# Patient Record
Sex: FEMALE | Race: BLACK OR AFRICAN AMERICAN | NOT HISPANIC OR LATINO | Employment: UNEMPLOYED | URBAN - METROPOLITAN AREA
[De-identification: names, ages, dates, MRNs, and addresses within clinical notes are randomized per-mention and may not be internally consistent; named-entity substitution may affect disease eponyms.]

---

## 2024-01-18 ENCOUNTER — OFFICE VISIT (OUTPATIENT)
Dept: FAMILY MEDICINE | Facility: CLINIC | Age: 64
End: 2024-01-18
Payer: COMMERCIAL

## 2024-01-18 VITALS
SYSTOLIC BLOOD PRESSURE: 128 MMHG | DIASTOLIC BLOOD PRESSURE: 68 MMHG | HEART RATE: 72 BPM | TEMPERATURE: 98.5 F | WEIGHT: 202 LBS | OXYGEN SATURATION: 100 % | RESPIRATION RATE: 16 BRPM | BODY MASS INDEX: 31.71 KG/M2 | HEIGHT: 67 IN

## 2024-01-18 DIAGNOSIS — Z00.00 ROUTINE GENERAL MEDICAL EXAMINATION AT A HEALTH CARE FACILITY: Primary | ICD-10-CM

## 2024-01-18 DIAGNOSIS — I10 BENIGN ESSENTIAL HYPERTENSION: ICD-10-CM

## 2024-01-18 DIAGNOSIS — E78.5 HYPERLIPIDEMIA LDL GOAL <130: ICD-10-CM

## 2024-01-18 LAB
ERYTHROCYTE [DISTWIDTH] IN BLOOD BY AUTOMATED COUNT: 12.1 % (ref 10–15)
HCT VFR BLD AUTO: 42.5 % (ref 35–47)
HGB BLD-MCNC: 14 G/DL (ref 11.7–15.7)
MCH RBC QN AUTO: 30.8 PG (ref 26.5–33)
MCHC RBC AUTO-ENTMCNC: 32.9 G/DL (ref 31.5–36.5)
MCV RBC AUTO: 94 FL (ref 78–100)
PLATELET # BLD AUTO: 188 10E3/UL (ref 150–450)
RBC # BLD AUTO: 4.54 10E6/UL (ref 3.8–5.2)
WBC # BLD AUTO: 5.4 10E3/UL (ref 4–11)

## 2024-01-18 PROCEDURE — 85027 COMPLETE CBC AUTOMATED: CPT | Performed by: FAMILY MEDICINE

## 2024-01-18 PROCEDURE — 82306 VITAMIN D 25 HYDROXY: CPT | Performed by: FAMILY MEDICINE

## 2024-01-18 PROCEDURE — 80053 COMPREHEN METABOLIC PANEL: CPT | Performed by: FAMILY MEDICINE

## 2024-01-18 PROCEDURE — 99386 PREV VISIT NEW AGE 40-64: CPT | Performed by: FAMILY MEDICINE

## 2024-01-18 PROCEDURE — 36415 COLL VENOUS BLD VENIPUNCTURE: CPT | Performed by: FAMILY MEDICINE

## 2024-01-18 PROCEDURE — 99213 OFFICE O/P EST LOW 20 MIN: CPT | Mod: 25 | Performed by: FAMILY MEDICINE

## 2024-01-18 PROCEDURE — 80061 LIPID PANEL: CPT | Performed by: FAMILY MEDICINE

## 2024-01-18 RX ORDER — ATORVASTATIN CALCIUM 20 MG/1
20 TABLET, FILM COATED ORAL DAILY
COMMUNITY
End: 2024-01-22

## 2024-01-18 RX ORDER — CETIRIZINE HYDROCHLORIDE 10 MG/1
10 TABLET ORAL DAILY
COMMUNITY

## 2024-01-18 RX ORDER — HYDROCHLOROTHIAZIDE 25 MG/1
25 TABLET ORAL DAILY
COMMUNITY
End: 2024-01-18

## 2024-01-18 ASSESSMENT — ENCOUNTER SYMPTOMS
HEMATOCHEZIA: 0
COUGH: 0
CONSTIPATION: 0
HEMATURIA: 0
ABDOMINAL PAIN: 0
CHILLS: 0

## 2024-01-18 NOTE — LETTER
January 22, 2024      Sole Black  [id number]   [facility] [facility] [place] [address]        Dear ,    We are writing to inform you of your test results.    I have reviewed your recent labs. Here are the results:     -Normal red blood cell (hgb) levels, normal white blood cell count and normal platelet levels.   - cholesterol is not well controled. LDL is elevated. Please take the medication and follow up labs in 3 months.   -Liver and gallbladder tests are normal (ALT,AST, Alk phos, bilirubin), kidney function is normal (Cr, GFR), sodium is normal, potassium is normal, calcium is normal, glucose is normal.   Vit d is low, I have prescribed a medication, which need to be taken every 7 days for 12 weeks. Follow up labs in 3 months.     Resulted Orders   Lipid panel reflex to direct LDL Fasting   Result Value Ref Range    Cholesterol 238 (H) <200 mg/dL    Triglycerides 134 <150 mg/dL    Direct Measure HDL 54 >=50 mg/dL    LDL Cholesterol Calculated 157 (H) <=100 mg/dL    Non HDL Cholesterol 184 (H) <130 mg/dL    Patient Fasting > 8hrs? No     Narrative    Cholesterol  Desirable:  <200 mg/dL    Triglycerides  Normal:  Less than 150 mg/dL  Borderline High:  150-199 mg/dL  High:  200-499 mg/dL  Very High:  Greater than or equal to 500 mg/dL    Direct Measure HDL  Female:  Greater than or equal to 50 mg/dL   Male:  Greater than or equal to 40 mg/dL    LDL Cholesterol  Desirable:  <100mg/dL  Above Desirable:  100-129 mg/dL   Borderline High:  130-159 mg/dL   High:  160-189 mg/dL   Very High:  >= 190 mg/dL    Non HDL Cholesterol  Desirable:  130 mg/dL  Above Desirable:  130-159 mg/dL  Borderline High:  160-189 mg/dL  High:  190-219 mg/dL  Very High:  Greater than or equal to 220 mg/dL   Comprehensive metabolic panel   Result Value Ref Range    Sodium 140 135 - 145 mmol/L      Comment:      Reference intervals for this test were updated on 09/26/2023 to more accurately reflect our healthy population.  There may be differences in the flagging of prior results with similar values performed with this method. Interpretation of those prior results can be made in the context of the updated reference intervals.     Potassium 3.9 3.4 - 5.3 mmol/L    Carbon Dioxide (CO2) 25 22 - 29 mmol/L    Anion Gap 11 7 - 15 mmol/L    Urea Nitrogen 7.8 (L) 8.0 - 23.0 mg/dL    Creatinine 0.48 (L) 0.51 - 0.95 mg/dL    GFR Estimate >90 >60 mL/min/1.73m2    Calcium 9.5 8.8 - 10.2 mg/dL    Chloride 104 98 - 107 mmol/L    Glucose 91 70 - 99 mg/dL    Alkaline Phosphatase 105 40 - 150 U/L      Comment:      Reference intervals for this test were updated on 11/14/2023 to more accurately reflect our healthy population. There may be differences in the flagging of prior results with similar values performed with this method. Interpretation of those prior results can be made in the context of the updated reference intervals.    AST 23 0 - 45 U/L      Comment:      Reference intervals for this test were updated on 6/12/2023 to more accurately reflect our healthy population. There may be differences in the flagging of prior results with similar values performed with this method. Interpretation of those prior results can be made in the context of the updated reference intervals.    ALT 16 0 - 50 U/L      Comment:      Reference intervals for this test were updated on 6/12/2023 to more accurately reflect our healthy population. There may be differences in the flagging of prior results with similar values performed with this method. Interpretation of those prior results can be made in the context of the updated reference intervals.      Protein Total 7.6 6.4 - 8.3 g/dL    Albumin 4.3 3.5 - 5.2 g/dL    Bilirubin Total 0.6 <=1.2 mg/dL   Vitamin D Deficiency   Result Value Ref Range    Vitamin D, Total (25-Hydroxy) 14 (L) 20 - 50 ng/mL      Comment:      mild to moderate deficiency    Narrative    Season, race, dietary intake, and treatment affect the  concentration of 25-hydroxy-Vitamin D. Values may decrease during winter months and increase during summer months.    Vitamin D determination is routinely performed by an immunoassay specific for 25 hydroxyvitamin D3.  If an individual is on vitamin D2(ergocalciferol) supplementation, please specify 25 OH vitamin D2 and D3 level determination by LCMSMS test [id number].     CBC with platelets   Result Value Ref Range    WBC Count 5.4 4.0 - 11.0 10e3/uL    RBC Count 4.54 3.80 - 5.20 10e6/uL    Hemoglobin 14.0 11.7 - 15.7 g/dL    Hematocrit 42.5 35.0 - 47.0 %    MCV 94 78 - 100 fL    MCH 30.8 26.5 - 33.0 pg    MCHC 32.9 31.5 - 36.5 g/dL    RDW 12.1 10.0 - 15.0 %    Platelet Count 188 150 - 450 10e3/uL       If you have any questions or concerns, please call the clinic at the number listed above.       Sincerely,      Jamie Baldwin MD

## 2024-01-18 NOTE — PROGRESS NOTES
Preventive Care Visit  [facility] [place] Baldwin MD, Family Medicine  2024       SUBJECTIVE:   Sole is a 63 year old, presenting for the following:  Physical        2024     2:46 PM   Additional Questions   Roomed by Fior DE LA CRUZ CMA     Healthy Habits:     Getting at least 3 servings of Calcium per day:  Yes    Bi-annual eye exam:  NO    Dental care twice a year:  Yes    Sleep apnea or symptoms of sleep apnea:  None    Diet:  Low salt    Frequency of exercise:  1 day/week    Duration of exercise:  Less than 15 minutes    Taking medications regularly:  Yes    Medication side effects:  None    Additional concerns today:  No  Patient recently moved from [place] . 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  Denies any concerns ,she was in the past told she has high blood pressure and was given hydrochlorothiazide and cholesterol medication.  Currently she is not taking either one of them.  Refusing PAP and Mammograms. No issues.    Today's PHQ-2 Score:       2024     2:44 PM   PHQ-2 (  [employer])   Q1: Little interest or pleasure in doing things 0   Q2: Feeling down, depressed or hopeless 0   PHQ-2 Score 0   Q1: Little interest or pleasure in doing things Not at all   Q2: Feeling down, depressed or hopeless Not at all   PHQ-2 Score 0             Social History     Tobacco Use    Smoking status: Not on file    Smokeless tobacco: Not on file   Substance Use Topics    Alcohol use: Not on file           2024     2:44 PM   Alcohol Use   Prescreen: >3 drinks/day or >7 drinks/week? No     Reviewed orders with patient.  Reviewed health maintenance and updated orders accordingly - Yes  Lab work is in process    Breast Cancer Screenin/18/2024     2:45 PM   Breast CA Risk Assessment (FHS-7)   Do you have a family history of breast, colon, or ovarian cancer? No / Unknown           Pertinent mammograms are reviewed under the imaging tab.    History of abnormal Pap  "smear: no PAP history, refused.     Reviewed and updated as needed this visit by clinical staff    Allergies  Meds              Reviewed and updated as needed this visit by Provider    Review of Systems   Constitutional:  Negative for chills.   HENT:  Negative for congestion.    Respiratory:  Negative for cough.    Cardiovascular:  Negative for chest pain.   Gastrointestinal:  Negative for abdominal pain and constipation.   Genitourinary:  Negative for hematuria.         OBJECTIVE:   /68   Pulse 72   Temp 98.5  F (36.9  C) (Tympanic)   Resp 16   Ht 1.702 m (5' 7\")   Wt 91.6 kg (202 lb)   SpO2 100%   BMI 31.64 kg/m     Estimated body mass index is 31.64 kg/m  as calculated from the following:    Height as of this encounter: 1.702 m (5' 7\").    Weight as of this encounter: 91.6 kg (202 lb).  Physical Exam  GENERAL: alert and no distress  EYES: Eyes grossly normal to inspection, PERRL and conjunctivae and sclerae normal  HENT: ear canals and TM's normal, nose and mouth without ulcers or lesions  NECK: no adenopathy, no asymmetry, masses, or scars  RESP: lungs clear to auscultation - no rales, rhonchi or wheezes  CV: regular rate and rhythm, normal S1 S2, no S3 or S4, no murmur, click or rub, no peripheral edema  ABDOMEN: soft, nontender, no hepatosplenomegaly, no masses and bowel sounds normal  MS: no gross musculoskeletal defects noted, no edema  SKIN: no suspicious lesions or rashes  NEURO: Normal strength and tone, mentation intact and speech normal  PSYCH: mentation appears normal, affect normal/bright  LYMPH: no cervical, supraclavicular, axillary, or inguinal adenopathy    Diagnostic Test Results:  Labs reviewed in Epic    ASSESSMENT/PLAN:   Routine general medical examination at a health care facility    - Lipid panel reflex to direct LDL Fasting; Future  - Comprehensive metabolic panel; Future  - Vitamin D Deficiency; Future  - CBC with platelets; Future  - Lipid panel reflex to direct LDL " Fasting  - Comprehensive metabolic panel  - Vitamin D Deficiency  - CBC with platelets    Benign essential hypertension  Currently off medication her blood pressure stable if labs are normal she would like to continue to hold that medication.  - Comprehensive metabolic panel; Future  - Comprehensive metabolic panel    Hyperlipidemia LDL goal <130  Based on lab work we will follow-up on that.  - Lipid panel reflex to direct LDL Fasting; Future  - Comprehensive metabolic panel; Future  - Lipid panel reflex to direct LDL Fasting  - Comprehensive metabolic panel          Counseling  Reviewed preventive health counseling, as reflected in patient instructions       Healthy diet/nutrition       Vision screening       Hearing screening        She has no history on file for tobacco use.        Signed Electronically by: Jamie Baldwin MD

## 2024-01-19 LAB
ALBUMIN SERPL BCG-MCNC: 4.3 G/DL (ref 3.5–5.2)
ALP SERPL-CCNC: 105 U/L (ref 40–150)
ALT SERPL W P-5'-P-CCNC: 16 U/L (ref 0–50)
ANION GAP SERPL CALCULATED.3IONS-SCNC: 11 MMOL/L (ref 7–15)
AST SERPL W P-5'-P-CCNC: 23 U/L (ref 0–45)
BILIRUB SERPL-MCNC: 0.6 MG/DL
BUN SERPL-MCNC: 7.8 MG/DL (ref 8–23)
CALCIUM SERPL-MCNC: 9.5 MG/DL (ref 8.8–10.2)
CHLORIDE SERPL-SCNC: 104 MMOL/L (ref 98–107)
CHOLEST SERPL-MCNC: 238 MG/DL
CREAT SERPL-MCNC: 0.48 MG/DL (ref 0.51–0.95)
DEPRECATED HCO3 PLAS-SCNC: 25 MMOL/L (ref 22–29)
EGFRCR SERPLBLD CKD-EPI 2021: >90 ML/MIN/1.73M2
FASTING STATUS PATIENT QL REPORTED: NO
GLUCOSE SERPL-MCNC: 91 MG/DL (ref 70–99)
HDLC SERPL-MCNC: 54 MG/DL
LDLC SERPL CALC-MCNC: 157 MG/DL
NONHDLC SERPL-MCNC: 184 MG/DL
POTASSIUM SERPL-SCNC: 3.9 MMOL/L (ref 3.4–5.3)
PROT SERPL-MCNC: 7.6 G/DL (ref 6.4–8.3)
SODIUM SERPL-SCNC: 140 MMOL/L (ref 135–145)
TRIGL SERPL-MCNC: 134 MG/DL
VIT D+METAB SERPL-MCNC: 14 NG/ML (ref 20–50)

## 2024-01-22 DIAGNOSIS — E78.5 HYPERLIPIDEMIA LDL GOAL <130: Primary | ICD-10-CM

## 2024-01-22 DIAGNOSIS — E55.9 VITAMIN D DEFICIENCY: ICD-10-CM

## 2024-01-22 RX ORDER — ATORVASTATIN CALCIUM 20 MG/1
20 TABLET, FILM COATED ORAL DAILY
Qty: 90 TABLET | Refills: 3 | Status: SHIPPED | OUTPATIENT
Start: 2024-01-22

## 2024-04-18 ENCOUNTER — OFFICE VISIT (OUTPATIENT)
Dept: FAMILY MEDICINE | Facility: CLINIC | Age: 64
End: 2024-04-18
Payer: COMMERCIAL

## 2024-04-18 VITALS
TEMPERATURE: 98.1 F | OXYGEN SATURATION: 98 % | BODY MASS INDEX: 31.96 KG/M2 | RESPIRATION RATE: 28 BRPM | SYSTOLIC BLOOD PRESSURE: 136 MMHG | HEIGHT: 67 IN | WEIGHT: 203.6 LBS | DIASTOLIC BLOOD PRESSURE: 68 MMHG | HEART RATE: 73 BPM

## 2024-04-18 DIAGNOSIS — E78.5 HYPERLIPIDEMIA LDL GOAL <130: ICD-10-CM

## 2024-04-18 DIAGNOSIS — K21.9 GASTROESOPHAGEAL REFLUX DISEASE WITHOUT ESOPHAGITIS: ICD-10-CM

## 2024-04-18 DIAGNOSIS — M79.604 PAIN OF RIGHT LOWER EXTREMITY: ICD-10-CM

## 2024-04-18 DIAGNOSIS — E55.9 VITAMIN D DEFICIENCY: ICD-10-CM

## 2024-04-18 DIAGNOSIS — M25.511 RIGHT SHOULDER PAIN, UNSPECIFIED CHRONICITY: Primary | ICD-10-CM

## 2024-04-18 PROCEDURE — 99214 OFFICE O/P EST MOD 30 MIN: CPT | Performed by: FAMILY MEDICINE

## 2024-04-18 PROCEDURE — 82306 VITAMIN D 25 HYDROXY: CPT | Performed by: FAMILY MEDICINE

## 2024-04-18 PROCEDURE — 36415 COLL VENOUS BLD VENIPUNCTURE: CPT | Performed by: FAMILY MEDICINE

## 2024-04-18 RX ORDER — CETIRIZINE HYDROCHLORIDE 10 MG/1
10 TABLET ORAL DAILY
Status: CANCELLED | OUTPATIENT
Start: 2024-04-18

## 2024-04-18 NOTE — LETTER
April 22, 2024      Langvincederaul Black  [id number]   [facility] [address]        Dear ,    We are writing to inform you of your test results.    Vitamin-d level is  normal . She can just now stay on OTC supplement ( 1000- 2000 units ) daily to maintain a good level     Resulted Orders   Vitamin D Deficiency   Result Value Ref Range    Vitamin D, Total (25-Hydroxy) 37 20 - 50 ng/mL      Comment:      optimum levels    Narrative    Season, race, dietary intake, and treatment affect the concentration of 25-hydroxy-Vitamin D. Values may decrease during winter months and increase during summer months.    Vitamin D determination is routinely performed by an immunoassay specific for 25 hydroxyvitamin D3.  If an individual is on vitamin D2(ergocalciferol) supplementation, please specify 25 OH vitamin D2 and D3 level determination by LCMSMS test [id number].         If you have any questions or concerns, please call the clinic at the number listed above.       Sincerely,      Kinza Hackett MD

## 2024-04-18 NOTE — PROGRESS NOTES
Assessment & Plan     (M25.511) Right shoulder pain, unspecified chronicity  (primary encounter diagnosis)  Comment: x 2 weeks  Plan: diclofenac (VOLTAREN) 50 MG EC tablet      (M25.511) Right shoulder pain, unspecified chronicity  (primary encounter diagnosis)  Comment: x 2 weeks  Plan: diclofenac (VOLTAREN) 50 MG EC tablet          (M79.604) Pain of right lower extremity  Comment: mostly hip stiffness and muslce soreness rt buttock and thigh area  Plan: diclofenac (VOLTAREN) 50 MG EC tablet                   discussed  shoulder /  neck  leg , hip cares and symptomatic treatment including  adequate pain control, heat,  stretches etc. Script faxed    Showed her some shoulder and neck stretches and she was able to do some and reported some improvement    she will do follow up if no improvement or problem. Consider further evaluation and  physical therapy if needed.       (K21.9) Gastroesophageal reflux disease without esophagitis  Comment:   Plan: omeprazole (PRILOSEC) 20 MG DR capsule,         Helicobacter pylori Antigen Stool            Discussed possible differential diagnosis for her symptoms. Sounds like GERD, also talked about H pylori and other causes of recurrent heart burn . Check labs    also she is willing to try Prilosec 20 mg to see if helps with sx talked about reflux precautions etc . Suggest follow up in 4-6 week, sooner if problem. Consider further evaluation if needed.       (E55.9) Vitamin D deficiency  Comment: finished treatment so wants level checked   Plan: Vitamin D Deficiency                    Check labs. Script sent.Cares and  treatment discussed.   follow. up if problem   Patient and her daughter who was interpreting for her , expressed understanding and agreement with treatment plan. All patient's questions were answered, will let me know if has more later.  Medications: Rx's: Reviewed the potential side effects/complications of medications prescribed.         BMI  Estimated body mass  "index is 31.89 kg/m  as calculated from the following:    Height as of this encounter: 1.702 m (5' 7\").    Weight as of this encounter: 92.4 kg (203 lb 9.6 oz).   Weight management plan: Discussed healthy diet and exercise guidelines      Work on weight loss  Regular exercise  See Patient Instructions    Magalis Whipple is a 64 year old, presenting for the following health issues:  Pain (C/O pain in right arm for years; pain and edema in both legs for 2 weeks, blood pressure has been high per daughter and may need medication)        4/18/2024    11:14 AM   Additional Questions   Roomed by Ashley   Accompanied by Daughter -Jemma     History of Present Illness       Reason for visit:  Legs pain and arms    She eats 2-3 servings of fruits and vegetables daily.She consumes 1 sweetened beverage(s) daily.She exercises with enough effort to increase her heart rate 20 to 29 minutes per day.  She exercises with enough effort to increase her heart rate 4 days per week.   She is taking medications regularly.  . Rt arm / shoulder is worse last 1-2 week.  no recall of injury       Pain History:  When did you first notice your pain? Pain in  rt leg  for 2 weeks but pain in arm for years   Have you seen anyone else for your pain? No  How has your pain affected your ability to work? Not currently working - unrelated to pain  Where in your body do you have pain?  Legs and arm   just stiff sore achy no swelling redness etc .   Rt shoulder and arm is the worse and has been ongoing recurrent problem for a while. No radiating  numbness down the arm or leg .  Not taking any pain med's    She also wants her vit d lebe; checked since she was low and just finished her med's     She also has hx of possible GERD and  lately has lot of heart burn, bad breath and burps a lot' , especially at night.   Her  daughter thinks  she should be taking med's and also wants her to check for bacteria in her stool. No change in bm          Review of " "Systems  Constitutional, HEENT, cardiovascular, pulmonary, GI, , musculoskeletal, neuro, skin, endocrine and psych systems are negative, except as otherwise noted.      Objective    BP (!) 145/64 (BP Location: Left arm, Patient Position: Sitting, Cuff Size: Adult Large)   Pulse 73   Temp 98.1  F (36.7  C) (Oral)   Resp 28   Ht 1.702 m (5' 7\")   Wt 92.4 kg (203 lb 9.6 oz)   SpO2 98%   BMI 31.89 kg/m    Body mass index is 31.89 kg/m .  Physical Exam   GENERAL: alert and no distress  EYES: Eyes grossly normal to inspection, PERRL and conjunctivae and sclerae normal  NECK: no adenopathy, no asymmetry, masses, or scars  RESP: lungs clear to auscultation - no rales, rhonchi or wheezes  CV: regular rate and rhythm, normal S1 S2, no S3 or S4, no murmur, click or rub, no peripheral edema  ABDOMEN: soft, nontender, no hepatosplenomegaly, no masses and bowel sounds normal  MS: neck and rt shoulder with decreased range of motion  and tenderness to palpation along trapezius and anterior chest wall across upper shoulder and arm area and has diffuse tenderness and guarded posture, although after  I helped her do gentle stretches she was able to move her arm much better , left shoulder and arm is ok with good ROM   Rt hip and knee she was very guarded at first and after I did some passive ROM , then  she was moving her hip and knee much better .  reports some tenderness across thigh/ buttock area diffusely but  no swelling erythema , homans' sign negative   SKIN: no suspicious lesions or rashes  NEURO: Normal and symmetric strength and tone and mentation intact  PSYCH: mentation appears normal, affect normal          Signed Electronically by: Kinza Hackett MD    "

## 2024-04-18 NOTE — PATIENT INSTRUCTIONS
Take medications as directed.  Cares and symptomatic cares discussed   Follow up   in 3 weeks , sooner if problem or concern

## 2024-04-19 LAB — VIT D+METAB SERPL-MCNC: 37 NG/ML (ref 20–50)

## 2024-05-10 ENCOUNTER — OFFICE VISIT (OUTPATIENT)
Dept: FAMILY MEDICINE | Facility: CLINIC | Age: 64
End: 2024-05-10
Payer: COMMERCIAL

## 2024-05-10 VITALS
HEART RATE: 68 BPM | OXYGEN SATURATION: 100 % | BODY MASS INDEX: 31.55 KG/M2 | TEMPERATURE: 98.5 F | DIASTOLIC BLOOD PRESSURE: 81 MMHG | SYSTOLIC BLOOD PRESSURE: 147 MMHG | HEIGHT: 67 IN | WEIGHT: 201 LBS

## 2024-05-10 DIAGNOSIS — I10 HYPERTENSION, UNSPECIFIED TYPE: ICD-10-CM

## 2024-05-10 DIAGNOSIS — M62.830 BACK MUSCLE SPASM: ICD-10-CM

## 2024-05-10 DIAGNOSIS — E78.5 HYPERLIPIDEMIA LDL GOAL <130: ICD-10-CM

## 2024-05-10 DIAGNOSIS — M25.511 RIGHT SHOULDER PAIN, UNSPECIFIED CHRONICITY: Primary | ICD-10-CM

## 2024-05-10 PROCEDURE — 99214 OFFICE O/P EST MOD 30 MIN: CPT | Performed by: FAMILY MEDICINE

## 2024-05-10 RX ORDER — HYDROCHLOROTHIAZIDE 25 MG/1
25 TABLET ORAL DAILY
Qty: 90 TABLET | Refills: 3 | Status: SHIPPED | OUTPATIENT
Start: 2024-05-24

## 2024-05-10 RX ORDER — ATORVASTATIN CALCIUM 20 MG/1
20 TABLET, FILM COATED ORAL DAILY
Qty: 90 TABLET | Refills: 3 | Status: SHIPPED | OUTPATIENT
Start: 2024-05-24

## 2024-05-10 RX ORDER — NAPROXEN 500 MG/1
500 TABLET ORAL 2 TIMES DAILY WITH MEALS
Qty: 20 TABLET | Refills: 0 | Status: SHIPPED | OUTPATIENT
Start: 2024-05-10

## 2024-05-10 RX ORDER — CYCLOBENZAPRINE HCL 5 MG
5 TABLET ORAL 2 TIMES DAILY PRN
Qty: 30 TABLET | Refills: 0 | Status: SHIPPED | OUTPATIENT
Start: 2024-05-10 | End: 2024-07-11

## 2024-05-10 ASSESSMENT — PAIN SCALES - GENERAL: PAINLEVEL: EXTREME PAIN (8)

## 2024-05-10 NOTE — PROGRESS NOTES
Assessment & Plan     Right shoulder pain, unspecified chronicity  Right shoulder pain could be rotator cuff issue.  Suggested physical therapy would be the mainstay of treatment.  Use Flexeril as needed along with naproxen for better pain control  - cyclobenzaprine (FLEXERIL) 5 MG tablet; Take 1 tablet (5 mg) by mouth 2 times daily as needed for muscle spasms  - naproxen (NAPROSYN) 500 MG tablet; Take 1 tablet (500 mg) by mouth 2 times daily (with meals)  - Physical Therapy  Referral; Future    Hypertension, unspecified type  Pressure elevated will start her on hydrochlorothiazide advised to start 1 week from now.  - hydrochlorothiazide (HYDRODIURIL) 25 MG tablet; Take 1 tablet (25 mg) by mouth daily    Back muscle spasm    - cyclobenzaprine (FLEXERIL) 5 MG tablet; Take 1 tablet (5 mg) by mouth 2 times daily as needed for muscle spasms  - naproxen (NAPROSYN) 500 MG tablet; Take 1 tablet (500 mg) by mouth 2 times daily (with meals)  - Physical Therapy  Referral; Future    Hyperlipidemia LDL goal <130  Show numbers are high in the past she was on medication and has restarted that medication.  Advised to take it 1 week from now as we do not want to start multiple medications at the same time.  - atorvastatin (LIPITOR) 20 MG tablet; Take 1 tablet (20 mg) by mouth daily            The longitudinal plan of care for the diagnosis(es)/condition(s) as documented were addressed during this visit. Due to the added complexity in care, I will continue to support Sole in the subsequent management and with ongoing continuity of care.      Magalis Whipple is a 64 year old, presenting for the following health issues:  Shoulder Pain  Patient came today with complains of ongoing shoulder pain she was recently evaluated was given anti-inflammatories which has not helped much she is complains of pain mostly with inward rotation.   She has history of blood pressure currently not on any medication ,in the  "past she has used hydrochlorothiazide.  She was also prescribed in the past cholesterol medication as well.  Denies  any chest pains no shortness of breath.  Most complaint is upper back spasm and neck pain along with shoulder range of motion limitation.      5/10/2024    11:02 AM   Additional Questions   Roomed by Arely PATTEN   Accompanied by Daughter     Shoulder Pain    History of Present Illness       Reason for visit:  Shoulder pain    She eats 2-3 servings of fruits and vegetables daily.She consumes 1 sweetened beverage(s) daily.She exercises with enough effort to increase her heart rate 9 or less minutes per day.  She exercises with enough effort to increase her heart rate 4 days per week.   She is taking medications regularly.             Review of Systems  Constitutional, HEENT, cardiovascular, pulmonary, gi and gu systems are negative, except as otherwise noted.      Objective    BP (!) 147/81 (BP Location: Right arm, Patient Position: Sitting, Cuff Size: Adult Large)   Pulse 68   Temp 98.5  F (36.9  C) (Tympanic)   Ht 1.695 m (5' 6.73\")   Wt 91.2 kg (201 lb)   SpO2 100%   BMI 31.73 kg/m    Body mass index is 31.73 kg/m .  Physical Exam   GENERAL: alert and no distress  NECK: no adenopathy, no asymmetry, masses, or scars  RESP: lungs clear to auscultation - no rales, rhonchi or wheezes  CV: regular rate and rhythm, normal S1 S2, no S3 or S4, no murmur, click or rub, no peripheral edema  ABDOMEN: soft, nontender, no hepatosplenomegaly, no masses and bowel sounds normal  Hide limitation in range of motion of the shoulder.  Specially inward rotation overhead activities are somewhat limited        Signed Electronically by: Jamie Baldwin MD    "

## 2024-05-14 ENCOUNTER — THERAPY VISIT (OUTPATIENT)
Dept: PHYSICAL THERAPY | Facility: CLINIC | Age: 64
End: 2024-05-14
Attending: FAMILY MEDICINE
Payer: COMMERCIAL

## 2024-05-14 DIAGNOSIS — M25.511 RIGHT SHOULDER PAIN, UNSPECIFIED CHRONICITY: ICD-10-CM

## 2024-05-14 DIAGNOSIS — M62.830 BACK MUSCLE SPASM: ICD-10-CM

## 2024-05-14 PROCEDURE — 97110 THERAPEUTIC EXERCISES: CPT | Mod: GP

## 2024-05-14 PROCEDURE — 97161 PT EVAL LOW COMPLEX 20 MIN: CPT | Mod: GP

## 2024-05-14 ASSESSMENT — ACTIVITIES OF DAILY LIVING (ADL)
PUTTING_ON_YOUR_PANTS: 6
PUTTING_ON_AN_UNDERSHIRT_OR_A_PULLOVER_SWEATER: 6
PLEASE_INDICATE_YOR_PRIMARY_REASON_FOR_REFERRAL_TO_THERAPY:: SHOULDER
PUTTING_ON_A_SHIRT_THAT_BUTTONS_DOWN_THE_FRONT: 6
PLACING_AN_OBJECT_ON_A_HIGH_SHELF: 6
PUSHING_WITH_THE_INVOLVED_ARM: 8
WHEN_LYING_ON_THE_INVOLVED_SIDE: 7
TOUCHING_THE_BACK_OF_YOUR_NECK: 8
AT_ITS_WORST?: 6
LEFS_SCORE(%): INCOMPLETE
REACHING_FOR_SOMETHING_ON_A_HIGH_SHELF: 8
REMOVING_SOMETHING_FROM_YOUR_BACK_POCKET: 6
PLEASE_INDICATE_YOR_PRIMARY_REASON_FOR_REFERRAL_TO_THERAPY:: FOOT AND/OR ANKLE
LEFS_RAW_SCORE: INCOMPLETE
WASHING_YOUR_HAIR?: 8
ANY_OF_YOUR_USUAL_WORK,_HOUSEWORK_OR_SCHOOL_ACTIVITIES: QUITE A BIT OF DIFFICULTY
WASHING_YOUR_BACK: 7

## 2024-05-14 NOTE — PROGRESS NOTES
PHYSICAL THERAPY EVALUATION  Type of Visit: Evaluation    See electronic medical record for Abuse and Falls Screening details.    Subjective       Presenting condition or subjective complaint: shoulder pain  Patient reports to outpatient physical therapy with R shoulder pain that started 6 months ago. 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 Cannot recall an event that would have brought on the pain. Had an injection in the past that helped with the pain. Lying on her R side brings on pain and with reaching out in front. Has been taking pain medication to help with the pain.    Goals: reach with less pain, sleep without pain    Date of onset: 05/10/24 (date of order)    Relevant medical history:     No past medical history on file.  Dates & types of surgery: 0  No past surgical history on file.    Prior diagnostic imaging/testing results:       Prior therapy history for the same diagnosis, illness or injury: No      Prior Level of Function  Transfers: Independent  Ambulation: Independent  ADL: Independent  IADL:     Living Environment  Social support: With family members   Type of home: Apartment/condo   Stairs to enter the home:         Ramp: Yes   Stairs inside the home: Yes 4 Is there a railing: No   Help at home: None  Equipment owned:       Employment: No    Hobbies/Interests:      Patient goals for therapy: reach thingsout    Pain assessment: Pain present     Objective   SHOULDER EVALUATION  PAIN: Pain Level at Rest: 1/10  Pain Level with Use: 8/10  Pain Location: shoulder and R side   POSTURE: Sitting Posture: Rounded shoulders  GAIT:   Weightbearing Status:  normal  Assistive Device(s): None  Gait Deviations:  not assessed  ROM:  L side WNL globally, R side: flexion = 90 deg +, ext = 30 deg slight pain, IR = R hip pocket ++, abd: 105 deg ++, 55 deg ++  STRENGTH:  L side: 5/5 globally, R side: flexion : 4+, ext = 4+, ER = 3+, abd: 4-, IR: 4+; ++ with abd, and slight pain  with ER on R side  SPECIAL TESTS:  not assessed  PALPATION:  tender to the touch: AC joint, spine of scapula, UT  JOINT MOBILITY:  PROM equal to AROM - difficult to assess if this is due to pain or purely joint mobility (soft vs firm end feel)  CERVICAL SCREEN:  flexion = WNL, extension = limited + R UT region, R rotation = slight limitation + R UT region and shoulder blad, L rotation = WNL, R side bend = WNL + R side, L side bend = WNL   Thoracic: WFL globally    Assessment & Plan   CLINICAL IMPRESSIONS  Medical Diagnosis: R shoulder pain    Treatment Diagnosis:     Impression/Assessment: Patient is a 64 year old female with R shoulder pain complaints.  The following significant findings have been identified: Pain, Decreased ROM/flexibility, Decreased joint mobility, Decreased strength, Impaired muscle performance, Decreased activity tolerance, and Impaired posture. These impairments interfere with their ability to perform self care tasks, recreational activities, household chores, household mobility, and community mobility as compared to previous level of function.     Clinical Decision Making (Complexity):  Clinical Presentation: Stable/Uncomplicated  Clinical Presentation Rationale: based on medical and personal factors listed in PT evaluation  Clinical Decision Making (Complexity): Low complexity    PLAN OF CARE  Treatment Interventions:  Modalities: E-stim, Hot Pack  Interventions: Manual Therapy, Neuromuscular Re-education, Therapeutic Activity, Therapeutic Exercise, Self-Care/Home Management    Long Term Goals     PT Goal 1  Goal Identifier: reaching  Goal Description: patient will be able to reach overhead with 2/10 pain  Rationale: to maximize safety and independence with performance of ADLs and functional tasks;to maximize safety and independence within the home;to maximize safety and independence with self cares  Goal Progress: unable to reach overhead at present  Target Date: 08/06/24  PT Goal 2  Goal  Identifier: sleeping  Goal Description: patient will be able to sleep without being woken up by pain  Rationale: to maximize safety and independence with performance of ADLs and functional tasks;to maximize safety and independence within the home;to maximize safety and independence with self cares  Target Date: 08/06/24      Frequency of Treatment: 1x/wk progressing to 1 x every other week  Duration of Treatment: 2-3 months    Recommended Referrals to Other Professionals: n/a at present  Education Assessment:   Learner/Method: Patient    Risks and benefits of evaluation/treatment have been explained.   Patient/Family/caregiver agrees with Plan of Care.     Evaluation Time:     PT Eval, Low Complexity Minutes (21051): 15     Signing Clinician: Emma Gonzalez, LUIZ SOL[facility]                                                                                   OUTPATIENT PHYSICAL THERAPY      PLAN OF TREATMENT FOR OUTPATIENT REHABILITATION   Patient's Last Name, First Name, JUICE BlackIndvincelorelei  G YOB: 1960   Provider's Name   [facility]   Medical Record No.  [id number]     Onset Date: 05/10/24 (date of order)  Start of Care Date: 05/14/24     Medical Diagnosis:  R shoulder pain      PT Treatment Diagnosis:    Plan of Treatment  Frequency/Duration: 1x/wk progressing to 1 x every other week/ 2-3 months    Certification date from 05/14/24 to 08/06/24         See note for plan of treatment details and functional goals     Emma Gonzalez, PT                         I CERTIFY THE NEED FOR THESE SERVICES FURNISHED UNDER        THIS PLAN OF TREATMENT AND WHILE UNDER MY CARE     (Physician attestation of this document indicates review and certification of the therapy plan).              Referring Provider:  Jamie Baldwin    Initial Assessment  See Epic Evaluation- Start of Care Date: 05/14/24

## 2024-07-11 ENCOUNTER — OFFICE VISIT (OUTPATIENT)
Dept: PEDIATRICS | Facility: CLINIC | Age: 64
End: 2024-07-11
Payer: COMMERCIAL

## 2024-07-11 ENCOUNTER — HOSPITAL ENCOUNTER (OUTPATIENT)
Dept: ULTRASOUND IMAGING | Facility: CLINIC | Age: 64
Discharge: HOME | End: 2024-07-11
Attending: STUDENT IN AN ORGANIZED HEALTH CARE EDUCATION/TRAINING PROGRAM
Payer: COMMERCIAL

## 2024-07-11 ENCOUNTER — ANCILLARY PROCEDURE (OUTPATIENT)
Dept: CT IMAGING | Facility: CLINIC | Age: 64
End: 2024-07-11
Attending: STUDENT IN AN ORGANIZED HEALTH CARE EDUCATION/TRAINING PROGRAM
Payer: COMMERCIAL

## 2024-07-11 ENCOUNTER — OFFICE VISIT (OUTPATIENT)
Dept: FAMILY MEDICINE | Facility: CLINIC | Age: 64
End: 2024-07-11
Payer: COMMERCIAL

## 2024-07-11 ENCOUNTER — TELEPHONE (OUTPATIENT)
Dept: FAMILY MEDICINE | Facility: CLINIC | Age: 64
End: 2024-07-11

## 2024-07-11 VITALS
HEART RATE: 69 BPM | RESPIRATION RATE: 20 BRPM | WEIGHT: 202.5 LBS | BODY MASS INDEX: 31.78 KG/M2 | OXYGEN SATURATION: 99 % | DIASTOLIC BLOOD PRESSURE: 78 MMHG | TEMPERATURE: 97.3 F | HEIGHT: 67 IN | SYSTOLIC BLOOD PRESSURE: 138 MMHG

## 2024-07-11 VITALS
RESPIRATION RATE: 20 BRPM | TEMPERATURE: 97.3 F | SYSTOLIC BLOOD PRESSURE: 138 MMHG | OXYGEN SATURATION: 99 % | HEIGHT: 67 IN | HEART RATE: 69 BPM | DIASTOLIC BLOOD PRESSURE: 78 MMHG | WEIGHT: 202.5 LBS | BODY MASS INDEX: 31.78 KG/M2

## 2024-07-11 DIAGNOSIS — H93.A9 PULSATILE TINNITUS: ICD-10-CM

## 2024-07-11 DIAGNOSIS — H93.A1 PULSATILE TINNITUS OF RIGHT EAR: Primary | ICD-10-CM

## 2024-07-11 DIAGNOSIS — M54.12 CERVICAL RADICULOPATHY: ICD-10-CM

## 2024-07-11 DIAGNOSIS — M62.838 MUSCLE SPASM: ICD-10-CM

## 2024-07-11 DIAGNOSIS — M48.02 NEURAL FORAMINAL STENOSIS OF CERVICAL SPINE: Primary | ICD-10-CM

## 2024-07-11 DIAGNOSIS — M54.2 NECK PAIN: ICD-10-CM

## 2024-07-11 DIAGNOSIS — M25.511 RIGHT SHOULDER PAIN, UNSPECIFIED CHRONICITY: ICD-10-CM

## 2024-07-11 DIAGNOSIS — H91.8X3 ASYMMETRICAL HEARING LOSS: ICD-10-CM

## 2024-07-11 DIAGNOSIS — R51.9 POSTAURICULAR PAIN: ICD-10-CM

## 2024-07-11 DIAGNOSIS — H92.01 MASTOID PAIN, RIGHT: ICD-10-CM

## 2024-07-11 LAB
ALBUMIN SERPL BCG-MCNC: 4.1 G/DL (ref 3.5–5.2)
ALP SERPL-CCNC: 100 U/L (ref 40–150)
ALT SERPL W P-5'-P-CCNC: 15 U/L (ref 0–50)
ANION GAP SERPL CALCULATED.3IONS-SCNC: 10 MMOL/L (ref 7–15)
AST SERPL W P-5'-P-CCNC: 24 U/L (ref 0–45)
BILIRUB SERPL-MCNC: 0.8 MG/DL
BUN SERPL-MCNC: 6 MG/DL (ref 8–23)
CALCIUM SERPL-MCNC: 9.5 MG/DL (ref 8.8–10.2)
CHLORIDE SERPL-SCNC: 106 MMOL/L (ref 98–107)
CREAT SERPL-MCNC: 0.5 MG/DL (ref 0.51–0.95)
DEPRECATED HCO3 PLAS-SCNC: 25 MMOL/L (ref 22–29)
EGFRCR SERPLBLD CKD-EPI 2021: >90 ML/MIN/1.73M2
ERYTHROCYTE [DISTWIDTH] IN BLOOD BY AUTOMATED COUNT: 12.3 % (ref 10–15)
GLUCOSE SERPL-MCNC: 103 MG/DL (ref 70–99)
HCT VFR BLD AUTO: 42 % (ref 35–47)
HGB BLD-MCNC: 13.7 G/DL (ref 11.7–15.7)
MCH RBC QN AUTO: 30 PG (ref 26.5–33)
MCHC RBC AUTO-ENTMCNC: 32.6 G/DL (ref 31.5–36.5)
MCV RBC AUTO: 92 FL (ref 78–100)
PLATELET # BLD AUTO: 203 10E3/UL (ref 150–450)
POTASSIUM SERPL-SCNC: 4.2 MMOL/L (ref 3.4–5.3)
PROT SERPL-MCNC: 7.7 G/DL (ref 6.4–8.3)
RBC # BLD AUTO: 4.56 10E6/UL (ref 3.8–5.2)
SODIUM SERPL-SCNC: 141 MMOL/L (ref 135–145)
WBC # BLD AUTO: 5 10E3/UL (ref 4–11)

## 2024-07-11 PROCEDURE — 85027 COMPLETE CBC AUTOMATED: CPT | Performed by: STUDENT IN AN ORGANIZED HEALTH CARE EDUCATION/TRAINING PROGRAM

## 2024-07-11 PROCEDURE — 80053 COMPREHEN METABOLIC PANEL: CPT | Performed by: STUDENT IN AN ORGANIZED HEALTH CARE EDUCATION/TRAINING PROGRAM

## 2024-07-11 PROCEDURE — 96372 THER/PROPH/DIAG INJ SC/IM: CPT | Performed by: STUDENT IN AN ORGANIZED HEALTH CARE EDUCATION/TRAINING PROGRAM

## 2024-07-11 PROCEDURE — 99215 OFFICE O/P EST HI 40 MIN: CPT | Mod: 25 | Performed by: STUDENT IN AN ORGANIZED HEALTH CARE EDUCATION/TRAINING PROGRAM

## 2024-07-11 PROCEDURE — 36415 COLL VENOUS BLD VENIPUNCTURE: CPT | Performed by: STUDENT IN AN ORGANIZED HEALTH CARE EDUCATION/TRAINING PROGRAM

## 2024-07-11 PROCEDURE — 93880 EXTRACRANIAL BILAT STUDY: CPT

## 2024-07-11 PROCEDURE — 99207 REFERRAL TO ACUTE AND DIAGNOSTIC SERVICES: CPT | Performed by: NURSE PRACTITIONER

## 2024-07-11 PROCEDURE — 72125 CT NECK SPINE W/O DYE: CPT

## 2024-07-11 PROCEDURE — 99417 PROLNG OP E/M EACH 15 MIN: CPT | Performed by: STUDENT IN AN ORGANIZED HEALTH CARE EDUCATION/TRAINING PROGRAM

## 2024-07-11 RX ORDER — PREDNISONE 20 MG/1
40 TABLET ORAL DAILY
Qty: 10 TABLET | Refills: 0 | Status: SHIPPED | OUTPATIENT
Start: 2024-07-11 | End: 2024-07-16

## 2024-07-11 RX ORDER — KETOROLAC TROMETHAMINE 30 MG/ML
30 INJECTION, SOLUTION INTRAMUSCULAR; INTRAVENOUS ONCE
Status: COMPLETED | OUTPATIENT
Start: 2024-07-11 | End: 2024-07-11

## 2024-07-11 RX ORDER — TIZANIDINE 2 MG/1
2 TABLET ORAL 3 TIMES DAILY PRN
Qty: 30 TABLET | Refills: 0 | Status: SHIPPED | OUTPATIENT
Start: 2024-07-11

## 2024-07-11 RX ORDER — KETOROLAC TROMETHAMINE 30 MG/ML
30 INJECTION, SOLUTION INTRAMUSCULAR; INTRAVENOUS ONCE
Status: DISCONTINUED | OUTPATIENT
Start: 2024-07-11 | End: 2024-07-11

## 2024-07-11 RX ORDER — GABAPENTIN 300 MG/1
300 CAPSULE ORAL 3 TIMES DAILY
Qty: 30 CAPSULE | Refills: 0 | Status: SHIPPED | OUTPATIENT
Start: 2024-07-11

## 2024-07-11 RX ADMIN — KETOROLAC TROMETHAMINE 30 MG: 30 INJECTION, SOLUTION INTRAMUSCULAR; INTRAVENOUS at 13:18

## 2024-07-11 ASSESSMENT — PAIN SCALES - GENERAL: PAINLEVEL: EXTREME PAIN (8)

## 2024-07-11 NOTE — TELEPHONE ENCOUNTER
Patient Quality Outreach    Patient is due for the following:   Hypertension -  BP check  Colon Cancer Screening  Breast Cancer Screening - Mammogram  Cervical Cancer Screening - PAP Needed    Next Steps:   Schedule a office visit for follow up     Type of outreach:    Sent letter.      Questions for provider review:    None           Haven Hankins, [facility]

## 2024-07-11 NOTE — LETTER
July 11, 2024      Sole Black  [address]   [place] [address]        Dear Sole,    I care about your health and have reviewed your health plan. I have reviewed your medical conditions, medication list, and lab results and am making recommendations based on this review, to better manage your health.    You are in particular need of attention regarding:  -High Blood Pressure  BP Readings from Last 3 Encounters:   05/10/24 (!) 147/81   04/18/24 136/68   01/18/24 128/68         I am recommending that you:  -schedule a FOLLOWUP OFFICE APPOINTMENT with me.       Here is a list of Health Maintenance topics that are due now or due soon:  Health Maintenance Due   Topic Date Due    Discuss Advance Care Planning  Never done    Mammogram  Never done    Colorectal Cancer Screening  Never done    HIV Screening  Never done    Hepatitis C Screening  Never done    PAP Smear  Never done    Diptheria Tetanus Pertussis (DTAP/TDAP/TD) Vaccine (1 - Tdap) Never done    Zoster (Shingles) Vaccine (1 of 2) Never done    RSV VACCINE (Pregnancy & 60+) (1 - 1-dose 60+ series) Never done    COVID-19 Vaccine (1 - 2023-24 season) Never done       Please call us at [phone] (or use FaceAlert[employer]) to address the above recommendations.     Thank you for trusting [facility] and we appreciate the opportunity to serve you.  We look forward to supporting your healthcare needs in the future.    Healthy Regards,    Jamie Baldwin MD

## 2024-07-11 NOTE — PROGRESS NOTES
"  Assessment & Plan   Problem List Items Addressed This Visit       Right shoulder pain, unspecified chronicity     Other Visit Diagnoses       Pulsatile tinnitus of right ear    -  Primary    Postauricular pain                 Chronic right shoulder pain with radicular symptoms. Here with worsening right ear symptoms the last few months. Describes pulsatile tinnitus with significant mastoid pain. Referred to [facility] for prompt imaging consider these worsening symptoms that would be concerning for mastoiditis or concerning vascular etiology. If workup is negative she could potentially benefit from ENT referral for persistent symptoms         Subjective   Sole is a 64 year old, presenting for the following health issues:  Musculoskeletal Problem (Patient is here for pain on the right side of her body.)        7/11/2024    10:58 AM   Additional Questions   Roomed by Jean-Paul SANTOS MA     History of Present Illness       Reason for visit:  Shoulder pain    She eats 2-3 servings of fruits and vegetables daily.She consumes 1 sweetened beverage(s) daily.She exercises with enough effort to increase her heart rate 9 or less minutes per day.    She is taking medications regularly.     Moved here from [place] and recently established care the beginning of this year.   Difficult history. Help from her daughter.     4 years of pain in R shoulder pain with shooting pain to hand  R Ear hurting more the last 3 months without improvement. Ear area painful to touch.  PT not helping since May (but only went once)  Frequent ear infection history   Has pulsatile tinnitus that is long standing, never imaged. This goes up and down but is always there at least at a dull level. She repeatedly says \"whoosh, whoosh\"  Hearing has recently gotten worse.   Family all have similar symptoms   Shoulder better after Cortisone injection in 2022 none since then.  8/10 pain every day all day  Tylenol ibuprofen but marginal help          Review of " "Systems  Detailed as above       Objective    /78 (BP Location: Right arm, Patient Position: Sitting, Cuff Size: Adult Large)   Pulse 69   Temp 97.3  F (36.3  C) (Temporal)   Resp 20   Ht 1.695 m (5' 6.75\")   Wt 91.9 kg (202 lb 8 oz)   SpO2 99%   BMI 31.95 kg/m    Body mass index is 31.95 kg/m .  Physical Exam  Constitutional:       Appearance: Normal appearance.   HENT:      Head:      Comments: Significant tenderness to right mastoid  Hypertonicity of right neck with tenderness      Right Ear: Tympanic membrane, ear canal and external ear normal.   Pulmonary:      Effort: Pulmonary effort is normal.   Musculoskeletal:         General: Normal range of motion.   Neurological:      Mental Status: She is alert.   Psychiatric:         Mood and Affect: Mood normal.                  Signed Electronically by: BARRY Brown CNP    "

## 2024-07-19 NOTE — PROGRESS NOTES
"Patient seen at the request of Faby Patiño for an opinion and evaluation of neck, shoulder, and arm pain.      HISTORY OF PRESENT ILLNESS:  Sole Black is a 64 year old female who presents with a chief complaint of neck, shoulder, and arm pain.       She was seen today in the clinic, accompanied by her daughter who provides translation assistance according to her preference. 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    Previously, the pain was intermittent and managed with intermittent use of pain medication during a flare of pain.  She left her job at the meat factory last August.  Since then, she has noticed worsening pain.  She was seen in the family medicine clinic 7/11/2024 in this regard.  At that visit she was prescribed gabapentin, 300 mg 3 times daily and a course of prednisone, and tizanidine.    She completed the course of prednisone and states that this, along with the gabapentin, have been helpful for the pain.  However pain is still quite bothersome and functionally limiting for her.    Today, she describes  -right side neck pain, radiating to the right upper back and right shoulder, down the lateral side of the right upper arm and into the base of the right thumb    She has also been experiencing ringing in her ears.  She states that her hearing is \"low\".  Her daughter notices needing to repeat herself due to the decreased hearing.  She has no ear pain.    She notes an injection done around the right elbow area 2 years ago which was helpful.  However, the pain is more diffuse affecting the right upper extremity at this time.    Aggravating factors include: moving the right arm, cervical range of motion  Relieving factors include: walking, exercise    Today, she rates the pain 7/10.  Patient states sleep is sometimes " "affected.    Patient's daughter notes that a week ago the patient felt \"unbalanced\", but says this is now resolved.  She associates this potentially with her GERD which she states has caused her to feel dizzy in the past.    She denies difficulty with fine motor tasks such as manipulating buttons or zippers, falls, weakness, bowel or bladder incontinence, saddle anesthesia, unintentional weight loss, fevers/chills, or night sweats.         PRIOR INJURIES/TREATMENT:   Ice/Heat: has tried both  Brace: none  Physical Therapy:   +PT 2 months ago for right shoulder pain, completed 1 session only, worked on the HEP without noticing a difference     - Current Pain Medications -   gabapentin 300 mg  TID  Tizanidine      - Prior/Trialed Pain Medications -   Prednisone, Rx 7/11/2024 completed - helped  Naproxen  Diclofenac tablet      Prior Procedures:  Date    Procedure   Improvement (%)  none              Prior Related Surgery: none   Other (acupuncture, OMT, CMM, TENS, DME, etc.): none    Specialists Seen - (with most recent, available notes and clinic visits reviewed)   1. none     IMAGING - reviewed   CT CERVICAL SPINE W/O CONTRAST 7/11/2024    FINDINGS: Straightening of the usual cervical lordosis. Cervical vertebral body heights preserved.  No acute cervical spine fracture. No prevertebral soft tissue swelling. Multilevel degenerative changes without evidence for high-grade spinal canal   narrowing. Neural foraminal narrowing is mild to moderate at C3-C4 and on the left at C5-C6.                                                     IMPRESSION:   1.  No acute cervical spine fracture.  2.  Degenerative changes without high-grade central spinal canal stenosis. Mild to moderate foraminal narrowing at C3-C4 and on the left at C5-C6.        Review Of Systems:  I am responding to those symptoms which are directly relevant to the specific indication for my consultation. I recommend that the patient follow up with their primary " or referring provider to pursue any other symptoms which may be of concern.       Medical History:  GERD, HLD, right shoulder pain, HTN, vitamin D deficiency    No past surgical history     Family History  Her family history is not on file.     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 Performs ADLs/IADLs independently.  She  reports that she has never smoked. She has never been exposed to tobacco smoke. She has never used smokeless tobacco.        Current Medications:   She has a current medication list which includes the following prescription(s): atorvastatin, atorvastatin, cetirizine, diclofenac, gabapentin, hydrochlorothiazide, naproxen, omeprazole, tizanidine, and vitamin d3.     Allergies:   No Known Allergies    PHYSICAL EXAMINATION:  BP (!) 143/98 (BP Location: Right arm, Patient Position: Sitting, Cuff Size: Adult Large)   Pulse 67   Wt 92.1 kg (203 lb)   SpO2 98%   BMI 32.03 kg/m  .  --CONSTITUTIONAL: Vital signs as above. No acute distress. The patient is well nourished and well groomed.  --PSYCHIATRIC: The patient is awake, alert, oriented to person, place, time and answering questions appropriately with clear speech. Appropriate mood and affect   --HEENT: Sclera are non-injected. Extraocular muscles are intact. Thyroid moves easily upon swallowing.  Moist oral mucosa.  --SKIN: observable skin is clean, dry, intact without rashes.  --RESPIRATORY: Normal rhythm and effort. No abnormal accessory muscle breathing patterns noted.   --GROSS MOTOR: Easily arises from a seated position. Gait is non-antalgic.loses balance with tandem gait  --CERVICAL SPINE: Inspection reveals no evidence of deformity. Range of motion is not limited in cervical flexion, extension, lateral rotation but all elicit pain.  There is tenderness palpation of the cervical paraspinals, upper trapezius bilaterally.   Spurling maneuver negative  bilaterally.  --SHOULDERS: Full range of motion on the left.  She is able to lift her right arm, but not to the level of the shoulder.  Range of motion in all directions with the right shoulder causes pain.  Empty can is positive.  Liftoff test is positive on the right.  --UPPER EXTREMITY MOTOR TESTING:  Wrist flexion left 5/5, right 5/5  Wrist extension left 5/5, right 5/5  Biceps left 5/5, right 5/5   Triceps left 5/5, right 5/5   Shoulder abduction left 4/5, right 4/5   left 5/5, right 5/5  Finger abduction left 5/5, right 5/5  --NEUROLOGIC: CN III-XII are grossly intact.   3/4 symmetric biceps, brachioradialis, reflexes bilaterally. Sensation to upper extremities is intact, except for discomfort with palpation of the right forearm and right 2,3,4,5 digits.  Negative Lyon's bilaterally.    --VASCULAR: Warm upper limbs bilaterally.           ASSESSMENT:  Sole Black is a pleasant 64 year old female who presents with   -right side neck pain, radiating to the right upper back and right shoulder, down the lateral side of the right upper arm and into the base of the right thumb  -Right shoulder pain  -Ringing in her ears, decreased hearing      PLAN:  -CT of the cervical spine was reviewed with the patient which shows cervical spondylosis and mild to moderate neuroforaminal stenosis at C3-4 and on the left C5-6, not congruent with the symptoms she presents today  -Add X ray of the right shoulder, which can be done after today's visit  -Add MRI of the cervical spine.  The order was faxed to [facility] so she can have an open MRI according to her preference  -She prefers to hold off on referral for PT at this time.  -Will titrate gabapentin from 300 mg 3 times daily to 600 mg 3 times daily according to the chart provided in the visit summary.  This titration schedule was reviewed in detail with the patient and her daughter today  -I asked her to make an appointment to follow-up with me at least 1 day after the  MRI to review the findings and discuss next steps  -Of note she is returning home for 3 months and plans to leave mid August.  We discussed that epidural steroid injection could be a consideration, but this may need to wait until after she returns  -RTC for review of MRI      Ready to learn, no apparent learning barriers.  Education provided on treatment plan according to patient's preferred learning style.  Patient verbalizes understanding.   __________________________________  Val Sanchez NP  Physical Medicine & Rehabilitation        53 minutes spent by me on the date of the encounter doing chart review, history and exam, documentation and further activities per the note

## 2024-07-26 ENCOUNTER — OFFICE VISIT (OUTPATIENT)
Dept: PHYSICAL MEDICINE AND REHAB | Facility: CLINIC | Age: 64
End: 2024-07-26
Attending: STUDENT IN AN ORGANIZED HEALTH CARE EDUCATION/TRAINING PROGRAM
Payer: COMMERCIAL

## 2024-07-26 ENCOUNTER — ANCILLARY PROCEDURE (OUTPATIENT)
Dept: GENERAL RADIOLOGY | Facility: CLINIC | Age: 64
End: 2024-07-26
Attending: NURSE PRACTITIONER
Payer: COMMERCIAL

## 2024-07-26 VITALS
HEART RATE: 67 BPM | WEIGHT: 203 LBS | OXYGEN SATURATION: 98 % | BODY MASS INDEX: 32.03 KG/M2 | SYSTOLIC BLOOD PRESSURE: 143 MMHG | DIASTOLIC BLOOD PRESSURE: 98 MMHG

## 2024-07-26 DIAGNOSIS — G89.29 CHRONIC RIGHT SHOULDER PAIN: ICD-10-CM

## 2024-07-26 DIAGNOSIS — M25.511 CHRONIC RIGHT SHOULDER PAIN: ICD-10-CM

## 2024-07-26 DIAGNOSIS — R26.89 BALANCE PROBLEMS: ICD-10-CM

## 2024-07-26 DIAGNOSIS — M54.12 CERVICAL RADICULOPATHY: Primary | ICD-10-CM

## 2024-07-26 DIAGNOSIS — M48.02 NEURAL FORAMINAL STENOSIS OF CERVICAL SPINE: ICD-10-CM

## 2024-07-26 DIAGNOSIS — M54.12 CERVICAL RADICULOPATHY: ICD-10-CM

## 2024-07-26 PROCEDURE — 99204 OFFICE O/P NEW MOD 45 MIN: CPT | Performed by: NURSE PRACTITIONER

## 2024-07-26 PROCEDURE — 73030 X-RAY EXAM OF SHOULDER: CPT | Mod: RT | Performed by: RADIOLOGY

## 2024-07-26 RX ORDER — GABAPENTIN 300 MG/1
600 CAPSULE ORAL 3 TIMES DAILY
Qty: 180 CAPSULE | Refills: 3 | Status: SHIPPED | OUTPATIENT
Start: 2024-07-26

## 2024-07-26 NOTE — LETTER
"7/26/2024       RE: Sole Black  [address]  [place] [address]     Dear Colleague,    Thank you for referring your patient, Sole Black, to the [facility] PH[facility] [place] [facility]. Please see a copy of my visit note below.    Patient seen at the request of Faby Patiño for an opinion and evaluation of neck, shoulder, and arm pain.      HISTORY OF PRESENT ILLNESS:  Sole Black is a 64 year old female who presents with a chief complaint of neck, shoulder, and arm pain.       She was seen today in the clinic, accompanied by her daughter who provides translation assistance according to her preference. 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    Previously, the pain was intermittent and managed with intermittent use of pain medication during a flare of pain.  She left her job at the meat factory last August.  Since then, she has noticed worsening pain.  She was seen in the family medicine clinic 7/11/2024 in this regard.  At that visit she was prescribed gabapentin, 300 mg 3 times daily and a course of prednisone, and tizanidine.    She completed the course of prednisone and states that this, along with the gabapentin, have been helpful for the pain.  However pain is still quite bothersome and functionally limiting for her.    Today, she describes  -right side neck pain, radiating to the right upper back and right shoulder, down the lateral side of the right upper arm and into the base of the right thumb    She has also been experiencing ringing in her ears.  She states that her hearing is \"low\".  Her daughter notices needing to repeat herself due to the decreased hearing.  She has no ear pain.    She notes an " "injection done around the right elbow area 2 years ago which was helpful.  However, the pain is more diffuse affecting the right upper extremity at this time.    Aggravating factors include: moving the right arm, cervical range of motion  Relieving factors include: walking, exercise    Today, she rates the pain 7/10.  Patient states sleep is sometimes affected.    Patient's daughter notes that a week ago the patient felt \"unbalanced\", but says this is now resolved.  She associates this potentially with her GERD which she states has caused her to feel dizzy in the past.    She denies difficulty with fine motor tasks such as manipulating buttons or zippers, falls, weakness, bowel or bladder incontinence, saddle anesthesia, unintentional weight loss, fevers/chills, or night sweats.         PRIOR INJURIES/TREATMENT:   Ice/Heat: has tried both  Brace: none  Physical Therapy:   +PT 2 months ago for right shoulder pain, completed 1 session only, worked on the HEP without noticing a difference     - Current Pain Medications -   gabapentin 300 mg  TID  Tizanidine      - Prior/Trialed Pain Medications -   Prednisone, Rx 7/11/2024 completed - helped  Naproxen  Diclofenac tablet      Prior Procedures:  Date    Procedure   Improvement (%)  none              Prior Related Surgery: none   Other (acupuncture, OMT, CMM, TENS, DME, etc.): none    Specialists Seen - (with most recent, available notes and clinic visits reviewed)   1. none     IMAGING - reviewed   CT CERVICAL SPINE W/O CONTRAST 7/11/2024    FINDINGS: Straightening of the usual cervical lordosis. Cervical vertebral body heights preserved.  No acute cervical spine fracture. No prevertebral soft tissue swelling. Multilevel degenerative changes without evidence for high-grade spinal canal   narrowing. Neural foraminal narrowing is mild to moderate at C3-C4 and on the left at C5-C6.                                                     IMPRESSION:   1.  No acute cervical spine " fracture.  2.  Degenerative changes without high-grade central spinal canal stenosis. Mild to moderate foraminal narrowing at C3-C4 and on the left at C5-C6.        Review Of Systems:  I am responding to those symptoms which are directly relevant to the specific indication for my consultation. I recommend that the patient follow up with their primary or referring provider to pursue any other symptoms which may be of concern.       Medical History:  GERD, HLD, right shoulder pain, HTN, vitamin D deficiency    No past surgical history     Family History  Her family history is not on file.     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 Performs ADLs/IADLs independently.  She  reports that she has never smoked. She has never been exposed to tobacco smoke. She has never used smokeless tobacco.        Current Medications:   She has a current medication list which includes the following prescription(s): atorvastatin, atorvastatin, cetirizine, diclofenac, gabapentin, hydrochlorothiazide, naproxen, omeprazole, tizanidine, and vitamin d3.     Allergies:   No Known Allergies    PHYSICAL EXAMINATION:  BP (!) 143/98 (BP Location: Right arm, Patient Position: Sitting, Cuff Size: Adult Large)   Pulse 67   Wt 92.1 kg (203 lb)   SpO2 98%   BMI 32.03 kg/m  .  --CONSTITUTIONAL: Vital signs as above. No acute distress. The patient is well nourished and well groomed.  --PSYCHIATRIC: The patient is awake, alert, oriented to person, place, time and answering questions appropriately with clear speech. Appropriate mood and affect   --HEENT: Sclera are non-injected. Extraocular muscles are intact. Thyroid moves easily upon swallowing.  Moist oral mucosa.  --SKIN: observable skin is clean, dry, intact without rashes.  --RESPIRATORY: Normal rhythm and effort. No abnormal accessory muscle breathing patterns noted.   --GROSS MOTOR: Easily arises from a seated  position. Gait is non-antalgic.loses balance with tandem gait  --CERVICAL SPINE: Inspection reveals no evidence of deformity. Range of motion is not limited in cervical flexion, extension, lateral rotation but all elicit pain.  There is tenderness palpation of the cervical paraspinals, upper trapezius bilaterally.   Spurling maneuver negative bilaterally.  --SHOULDERS: Full range of motion on the left.  She is able to lift her right arm, but not to the level of the shoulder.  Range of motion in all directions with the right shoulder causes pain.  Empty can is positive.  Liftoff test is positive on the right.  --UPPER EXTREMITY MOTOR TESTING:  Wrist flexion left 5/5, right 5/5  Wrist extension left 5/5, right 5/5  Biceps left 5/5, right 5/5   Triceps left 5/5, right 5/5   Shoulder abduction left 4/5, right 4/5   left 5/5, right 5/5  Finger abduction left 5/5, right 5/5  --NEUROLOGIC: CN III-XII are grossly intact.   3/4 symmetric biceps, brachioradialis, reflexes bilaterally. Sensation to upper extremities is intact, except for discomfort with palpation of the right forearm and right 2,3,4,5 digits.  Negative Lyon's bilaterally.    --VASCULAR: Warm upper limbs bilaterally.           ASSESSMENT:  Sole Black is a pleasant 64 year old female who presents with   -right side neck pain, radiating to the right upper back and right shoulder, down the lateral side of the right upper arm and into the base of the right thumb  -Right shoulder pain  -Ringing in her ears, decreased hearing      PLAN:  -CT of the cervical spine was reviewed with the patient which shows cervical spondylosis and mild to moderate neuroforaminal stenosis at C3-4 and on the left C5-6, not congruent with the symptoms she presents today  -Add X ray of the right shoulder, which can be done after today's visit  -Add MRI of the cervical spine.  The order was faxed to [facility] so she can have an open MRI according to her preference  -She prefers to  hold off on referral for PT at this time.  -Will titrate gabapentin from 300 mg 3 times daily to 600 mg 3 times daily according to the chart provided in the visit summary.  This titration schedule was reviewed in detail with the patient and her daughter today  -I asked her to make an appointment to follow-up with me at least 1 day after the MRI to review the findings and discuss next steps  -Of note she is returning home for 3 months and plans to leave mid August.  We discussed that epidural steroid injection could be a consideration, but this may need to wait until after she returns  -RTC for review of MRI      Ready to learn, no apparent learning barriers.  Education provided on treatment plan according to patient's preferred learning style.  Patient verbalizes understanding.   ________________________________    53 minutes spent by me on the date of the encounter doing chart review, history and exam, documentation and further activities per the note         Again, thank you for allowing me to participate in the care of your patient.      Sincerely,    BARRY Gaffney CNP

## 2024-07-26 NOTE — PATIENT INSTRUCTIONS
It was a pleasure seeing you in the clinic today.  As discussed, we made the following updates to your plan of care:       An XR order was added today.  I will send you a message about the x-ray result through [employer].     A MRI order was added today, which you can schedule after today's visit. We will fax the order to [facility] so she can have an open MRI. Please let us know by [employer] or call the clinic once you have the MRI done, since we will not automatically get report if you have the MRI done at  [facility]    You can increase the Gabapentin (Neurontin) for nerve pain, as tolerated, according to the chart below:           AM        PM       BEDTIME    Day 0-5        300       300      600mg  Day 5-10      600       300      600mg  Day 10+       600       600      600mg    Continue to increase your dose as discussed above if you are not experiencing any side effects (in other words - if you experience side effects do not progress to the next week). If you are experiencing any side effects, return to the previous dose that was tolerable and continue until follow-up.   The most common side effect is sedation. Do not drive a motor vehicle until after you are familiar with how the medication may impact your attention and reaction.  Note that it may take several weeks to notice the benefits of this medication.   Do not abruptly stopped this medication prior to consulting with a physician.     Please follow up for a hearing exam    Please schedule follow up with me after MRI. (Please schedule the follow up at least 1 day after the MRI to give radiology time to do the report as well.)       Thank you,  Val Sanchez NP  Physical Medicine and Rehabilitation, Medical Spine  PM&R clinic Phone: [phone]  PM[facility]R clinic Fax: [phone]

## 2024-08-12 PROBLEM — M25.511 RIGHT SHOULDER PAIN, UNSPECIFIED CHRONICITY: Status: RESOLVED | Noted: 2024-04-18 | Resolved: 2024-08-12

## 2024-10-10 DIAGNOSIS — K21.9 GASTROESOPHAGEAL REFLUX DISEASE WITHOUT ESOPHAGITIS: ICD-10-CM

## 2025-05-14 DIAGNOSIS — E78.5 HYPERLIPIDEMIA LDL GOAL <130: ICD-10-CM

## 2025-05-14 RX ORDER — ATORVASTATIN CALCIUM 20 MG/1
20 TABLET, FILM COATED ORAL DAILY
Qty: 90 TABLET | Refills: 0 | Status: SHIPPED | OUTPATIENT
Start: 2025-05-14

## 2025-05-14 NOTE — LETTER
May 14, 2025      Sole Black  [address]   [place] [address]          Dear Ms. Black,    Our records indicate that it is time to schedule a visit with your primary care provider.  You are due to be seen for a routine annual preventative visit.  We have sent to the pharmacy a 1 time refill of your medication until you can be seen by your provider.  You may call [phone] to schedule or via [employer] using the appointment tab.  If you are no longer a [facility] patient; please contact us and let us know that as well.  You will need to let the pharmacy know the name of your new provider so that they can send future refill requests to them.    Sincerely,    [facility] [place]                     [employer] dose 1 and 2

## 2025-05-14 NOTE — TELEPHONE ENCOUNTER
Attempted to call pt, call unable to be connect.  Unable to leave a message. No Mychart. Letter Mailed to Patient. Codey SOLIS CMA

## 2025-06-09 DIAGNOSIS — I10 HYPERTENSION, UNSPECIFIED TYPE: ICD-10-CM

## 2025-06-10 RX ORDER — HYDROCHLOROTHIAZIDE 25 MG/1
25 TABLET ORAL DAILY
Qty: 30 TABLET | Refills: 1 | Status: SHIPPED | OUTPATIENT
Start: 2025-06-10